# Patient Record
Sex: FEMALE | URBAN - METROPOLITAN AREA
[De-identification: names, ages, dates, MRNs, and addresses within clinical notes are randomized per-mention and may not be internally consistent; named-entity substitution may affect disease eponyms.]

---

## 2020-03-12 ENCOUNTER — APPOINTMENT (RX ONLY)
Dept: URBAN - METROPOLITAN AREA CLINIC 83 | Facility: CLINIC | Age: 35
Setting detail: DERMATOLOGY
End: 2020-03-12

## 2020-03-12 DIAGNOSIS — L71.8 OTHER ROSACEA: ICD-10-CM

## 2020-03-12 DIAGNOSIS — L29.89 OTHER PRURITUS: ICD-10-CM

## 2020-03-12 DIAGNOSIS — L29.8 OTHER PRURITUS: ICD-10-CM

## 2020-03-12 PROCEDURE — ? COUNSELING

## 2020-03-12 PROCEDURE — 99203 OFFICE O/P NEW LOW 30 MIN: CPT

## 2020-03-12 PROCEDURE — ? PRESCRIPTION

## 2020-03-12 RX ORDER — IVERMECTIN 10 MG/G
SMALL CREAM TOPICAL HS
Qty: 45 | Refills: 4 | Status: ERX | COMMUNITY
Start: 2020-03-12

## 2020-03-12 RX ADMIN — IVERMECTIN SMALL: 10 CREAM TOPICAL at 00:00

## 2020-03-12 NOTE — HPI: RASH
What Type Of Note Output Would You Prefer (Optional)?: Standard Output
Is The Patient Presenting As Previously Scheduled?: No, they are a work-in
How Severe Is Your Rash?: mild
Is This A New Presentation, Or A Follow-Up?: Rash
Additional History: Rash present for 3 years on cheeks and face, heat does make it worse. Bumps are sometimes painful.